# Patient Record
(demographics unavailable — no encounter records)

---

## 2025-04-10 NOTE — CONSULT LETTER
[Dear  ___] : Dear  [unfilled], [Consult Letter:] : I had the pleasure of evaluating your patient, [unfilled]. [Please see my note below.] : Please see my note below. [Consult Closing:] : Thank you very much for allowing me to participate in the care of this patient.  If you have any questions, please do not hesitate to contact me. [Sincerely,] : Sincerely, [FreeTextEntry3] : Tank Thakkar MD, FACS

## 2025-04-10 NOTE — PLAN
[FreeTextEntry1] : Ms. LEYVA  was told significance of findings, options, risks and benefits were explained.  Informed consent for excision posterior scalp mass , and potential risks, benefits and alternatives (surgical options were discussed including non-surgical options or the option of no surgery) to the planned surgery were discussed in depth.  All surgical options were discussed including non-surgical treatments.  She wishes to proceed with surgery.  We will plan for surgery on at the next available date, pending any required insurance pre-certification or pre-approval. She agrees to obtain any necessary pre-operative evaluations and testing prior to surgery. Patient advised to seek immediate medical attention with any acute change in symptoms or with the development of any new or worsening symptoms.  Patient's questions and concerns addressed to patient's satisfaction, and patient verbalized an understanding of the information discussed.

## 2025-04-10 NOTE — HISTORY OF PRESENT ILLNESS
[de-identified] : This is a 77 year  old patient who was referred by Dr. Adithya Mckeon with the chief complaint of having posterior scalp  mass.  She reports having this condition for 3 months. She denies any trauma to the area.   She denies any fever or  night sweats. Appetite is good and weight is stable.  She states that recently the mass started to  get  bigger and  more symptomatic. She wants to know if it could  be surgically  removed. she takes Jardiance for weight lose

## 2025-04-10 NOTE — PHYSICAL EXAM
[Alert] : alert [Oriented to Person] : oriented to person [Oriented to Place] : oriented to place [Oriented to Time] : oriented to time [Calm] : calm [de-identified] : She  is alert, well-groomed, and in NAD   [de-identified] : anicteric.  Nasal mucosa pink, septum midline. Oral mucosa pink.  Tongue midline, Pharynx without exudates.   [de-identified] : Neck supple. Trachea midline. Thyroid isthmus barely palpable, lobes not felt.   [de-identified] : posterior scalp mass is  mobile, Firm,  Smooth, non-tender,   Well defined. Superficial . No palpable lymph nodes.   Mass size - 2.5  cm x   2.5 cm.

## 2025-05-05 NOTE — HISTORY OF PRESENT ILLNESS
[de-identified] : Ms. SOLORIO  is s/p excision of posterior scalp mass on 04/25/2025.  Today  Ms. SOLORIO offers no complaints. patient reports no fever, chills,  or  pain.  Her surgical wound is healing well. No signs of inflammation, infection or exudate.  stitches intact   Patient reports good bowel movements and appetite.

## 2025-05-05 NOTE — PHYSICAL EXAM
[Alert] : alert [Oriented to Person] : oriented to person [Oriented to Place] : oriented to place [Oriented to Time] : oriented to time [Calm] : calm [de-identified] : She  is alert, well-groomed, and in NAD   [de-identified] : anicteric.  Nasal mucosa pink, septum midline. Oral mucosa pink.  Tongue midline, Pharynx without exudates.   [de-identified] : Neck supple. Trachea midline. Thyroid isthmus barely palpable, lobes not felt.   [de-identified] : posterior scalp  Surgical wound is healing well.   no signs of  inflammation or infection.

## 2025-05-05 NOTE — PHYSICAL EXAM
[Alert] : alert [Oriented to Person] : oriented to person [Oriented to Place] : oriented to place [Oriented to Time] : oriented to time [Calm] : calm [de-identified] : She  is alert, well-groomed, and in NAD   [de-identified] : anicteric.  Nasal mucosa pink, septum midline. Oral mucosa pink.  Tongue midline, Pharynx without exudates.   [de-identified] : Neck supple. Trachea midline. Thyroid isthmus barely palpable, lobes not felt.   [de-identified] : posterior scalp  Surgical wound is healing well.   no signs of  inflammation or infection.

## 2025-05-05 NOTE — PLAN
[FreeTextEntry1] : Ms. SOLORIO will follow up  if needed. Warning signs, follow up, and restrictions were discussed with the patient.   Patient instructed to  call the office  in 2 weeks for results of the pathology.

## 2025-05-05 NOTE — HISTORY OF PRESENT ILLNESS
[de-identified] : Ms. SOLORIO  is s/p excision of posterior scalp mass on 04/25/2025.  Today  Ms. SOLORIO offers no complaints. patient reports no fever, chills,  or  pain.  Her surgical wound is healing well. No signs of inflammation, infection or exudate.  stitches intact   Patient reports good bowel movements and appetite.

## 2025-05-05 NOTE — ASSESSMENT
[FreeTextEntry1] : Ms. SOLORIO is doing well, with excellent post-operative recovery. All surgical incisions are healing well and as expected. There is no evidence of infection or complication, and she is progressing as expected. stitches removed.  Post-operative wound care, activity, restrictions and precautions reinforced.  Patient instructed to  call the office  in 2 weeks for results of the pathology. Patient's questions and concerns addressed to patient's satisfaction.